# Patient Record
(demographics unavailable — no encounter records)

---

## 2025-02-10 NOTE — ADDENDUM
[FreeTextEntry1] : Patient: Eve Faria  Chief Complaint (CC):   - Patient complained of a bothersome sensation in the face, described as feeling like ants crawling underneath.  History of Present Illness:   - The patient, who previously had a surgery in 2013 due to pain in the face, now complains about a bothersome, non-painful sensation described as feeling like ants crawling underneath her skin. She recalls that someone suggested it could be shingles, however, she was unsure. Additionally, she mentioned a tremor which she is concerned might be indicative of Parkinson's disease. Other concerns include worries about the recurrence of the previous facial pain and an undisclosed symptom which led to a prescription from an ophthalmologist. The patient also expressed that she was prescribed medication for a herpes or zoster infection (Acyclovir/Valtrex).   Past Medical History (PMH):   - The patient underwent a surgery in 2013 due to pain in her face.  Family History (FH):   - Family history information was not mentioned in the conversation.  Social History (SH):   - The patient's social history was not mentioned in the conversation.  Medications:   - The patient was prescribed Acyclovir and Valtrex for a suspected herpes or zoster infection. Additionally, the patient was given a prescription for eye drops by an ophthalmologist.  Allergies:   - The patient did not mention any known allergies.  Review of Systems (ROS):   - The patient complained of a bothersome sensation in the face and a tremor. No other symptoms were reported.  Physical Examination (PE):   - The provider noted that the patient displayed a tremor but made no other observations.  Laboratory/Data Review:   - There was no laboratory or data review mentioned in the conversation.  Assessment:   - Upon my assessment, the tremor the patient is experiencing is not indicative of Parkinson's disease. The bothersome sensation the patient is experiencing, which is non-painful, may potentially derive from a case of shingles or a herpes infection.  Plan:   - I advised the patient to stop taking the eye drops prescribed by the ophthalmologist. I reassured her that the tremor she is experiencing is not connected to Parkinson's disease, and that cases of Parkinson's do not typically start with symptoms in the facial area. I suggested the patient to visit a neurologist for further examination of the tremor. I also tried to address her anxiety and fear about her symptoms worsen or previous facial pain returning.  Preventive Health:   - There was no preventive health conversation in the dialogue.

## 2025-02-10 NOTE — HISTORY OF PRESENT ILLNESS
[de-identified] : This is a 81 year old woman S/P left microvascular decompression craniotomy for trigeminal neuralgia on 11/15/13. Post op was pain free. Now with feeling on ants on the left side of her face. denies pain. Reports newer upper extremity tremors independent

## 2025-02-10 NOTE — PHYSICAL EXAM
[General Appearance - Alert] : alert [Oriented To Time, Place, And Person] : oriented to person, place, and time [Impaired Insight] : insight and judgment were intact [Person] : oriented to person [Place] : oriented to place [Cranial Nerves Optic (II)] : visual acuity intact bilaterally,  pupils equal round and reactive to light [Cranial Nerves Oculomotor (III)] : extraocular motion intact [Cranial Nerves Trigeminal (V)] : facial sensation intact symmetrically [Cranial Nerves Facial (VII)] : face symmetrical [Cranial Nerves Vestibulocochlear (VIII)] : hearing was intact bilaterally [Cranial Nerves Accessory (XI - Cranial And Spinal)] : head turning and shoulder shrug symmetric [Cranial Nerves Hypoglossal (XII)] : there was no tongue deviation with protrusion [Time] : disoriented to time

## 2025-02-10 NOTE — ASSESSMENT
[FreeTextEntry1] : I, Dr. Lancaster, personally performed the evaluation and management (E/M) services for this new patient who presents today with exacerbation of (an) existing condition(s). That E/M includes conducting the examination, assessing all new/exacerbated conditions, and establishing a new plan of care. Today, my ACP, Sandra Campuzano, was here to observe my evaluation and management services for this new problem/exacerbated condition to be followed going forward.  PLAN: - Patient should follow up with Neurology for concern of occasional tremors

## 2025-02-10 NOTE — HISTORY OF PRESENT ILLNESS
[de-identified] : This is a 81 year old woman S/P left microvascular decompression craniotomy for trigeminal neuralgia on 11/15/13. Post op was pain free. Now with feeling on ants on the left side of her face. denies pain. Reports newer upper extremity tremors